# Patient Record
Sex: FEMALE | Race: WHITE | NOT HISPANIC OR LATINO | URBAN - METROPOLITAN AREA
[De-identification: names, ages, dates, MRNs, and addresses within clinical notes are randomized per-mention and may not be internally consistent; named-entity substitution may affect disease eponyms.]

---

## 2021-11-05 ENCOUNTER — APPOINTMENT (RX ONLY)
Dept: URBAN - METROPOLITAN AREA CLINIC 151 | Facility: CLINIC | Age: 56
Setting detail: DERMATOLOGY
End: 2021-11-05

## 2021-11-05 DIAGNOSIS — D22 MELANOCYTIC NEVI: ICD-10-CM

## 2021-11-05 DIAGNOSIS — Z71.89 OTHER SPECIFIED COUNSELING: ICD-10-CM

## 2021-11-05 DIAGNOSIS — L81.4 OTHER MELANIN HYPERPIGMENTATION: ICD-10-CM

## 2021-11-05 DIAGNOSIS — L82.1 OTHER SEBORRHEIC KERATOSIS: ICD-10-CM

## 2021-11-05 DIAGNOSIS — L57.0 ACTINIC KERATOSIS: ICD-10-CM

## 2021-11-05 PROBLEM — D22.5 MELANOCYTIC NEVI OF TRUNK: Status: ACTIVE | Noted: 2021-11-05

## 2021-11-05 PROCEDURE — ? COUNSELING

## 2021-11-05 PROCEDURE — ? FULL BODY SKIN EXAM

## 2021-11-05 PROCEDURE — ? ORDER FOR PHOTODYNAMIC THERAPY

## 2021-11-05 PROCEDURE — 99203 OFFICE O/P NEW LOW 30 MIN: CPT

## 2021-11-05 PROCEDURE — ? PRESCRIPTION MEDICATION MANAGEMENT

## 2021-11-05 PROCEDURE — ? PHOTODYNAMIC THERAPY COUNSELING

## 2021-11-05 ASSESSMENT — LOCATION SIMPLE DESCRIPTION DERM
LOCATION SIMPLE: LEFT UPPER BACK
LOCATION SIMPLE: ABDOMEN
LOCATION SIMPLE: CHEST
LOCATION SIMPLE: RIGHT POSTERIOR THIGH
LOCATION SIMPLE: INFERIOR FOREHEAD
LOCATION SIMPLE: CHIN
LOCATION SIMPLE: RIGHT UPPER BACK
LOCATION SIMPLE: LEFT CHEEK
LOCATION SIMPLE: RIGHT CHEEK

## 2021-11-05 ASSESSMENT — LOCATION DETAILED DESCRIPTION DERM
LOCATION DETAILED: LEFT CHIN
LOCATION DETAILED: RIGHT DISTAL POSTERIOR THIGH
LOCATION DETAILED: LEFT INFERIOR MEDIAL MALAR CHEEK
LOCATION DETAILED: MIDDLE STERNUM
LOCATION DETAILED: INFERIOR MID FOREHEAD
LOCATION DETAILED: LEFT MID-UPPER BACK
LOCATION DETAILED: LEFT CENTRAL MALAR CHEEK
LOCATION DETAILED: RIGHT SUPERIOR UPPER BACK
LOCATION DETAILED: RIGHT MEDIAL MALAR CHEEK
LOCATION DETAILED: EPIGASTRIC SKIN

## 2021-11-05 ASSESSMENT — LOCATION ZONE DERM
LOCATION ZONE: TRUNK
LOCATION ZONE: LEG
LOCATION ZONE: FACE

## 2021-11-05 NOTE — PROCEDURE: ORDER FOR PHOTODYNAMIC THERAPY
Chest Incubation Time: 2 Hours
Face And Scalp Incubation Time: 1 Hour for the face and 2 Hours for the scalp
Occlusion: No
Detail Level: Simple
Face, Ears And  Scalp Incubation Time: 1 Hour
Photosensitizer: Levulan
Face Incubation Time: 1 Hour omar to step in
Pdt Type: ELVIRA-U
Consent: The procedure and risks were reviewed with the patient including but not limited to: burning, pigmentary changes, pain, blistering, scabbing, redness, and the possibility of needing numerous treatments. Strict photoprotection after the procedure was also discussed.
Location: Face
Frequency Of Pdt: Single Treatment
Location: Chest
Chest Incubation Time: 2 Hours omar to step in

## 2021-11-05 NOTE — PROCEDURE: PRESCRIPTION MEDICATION MANAGEMENT
Render In Strict Bullet Format?: No
Detail Level: Zone
Plan: Spoke to patient about different treatment options including liquid nitrogen vs PDT blue light vs efudex topical cream. 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 Patient states that she would have time to have any treatment for the next few weeks. Patient states that she would like to have PDT light to the chest and the face.

## 2024-02-01 ENCOUNTER — APPOINTMENT (RX ONLY)
Dept: URBAN - METROPOLITAN AREA CLINIC 132 | Facility: CLINIC | Age: 59
Setting detail: DERMATOLOGY
End: 2024-02-01

## 2024-02-01 DIAGNOSIS — L71.8 OTHER ROSACEA: ICD-10-CM

## 2024-02-01 DIAGNOSIS — L57.0 ACTINIC KERATOSIS: ICD-10-CM

## 2024-02-01 DIAGNOSIS — L81.4 OTHER MELANIN HYPERPIGMENTATION: ICD-10-CM | Status: STABLE

## 2024-02-01 DIAGNOSIS — L82.1 OTHER SEBORRHEIC KERATOSIS: ICD-10-CM | Status: STABLE

## 2024-02-01 DIAGNOSIS — D18.0 HEMANGIOMA: ICD-10-CM | Status: STABLE

## 2024-02-01 DIAGNOSIS — D22 MELANOCYTIC NEVI: ICD-10-CM | Status: STABLE

## 2024-02-01 DIAGNOSIS — L57.8 OTHER SKIN CHANGES DUE TO CHRONIC EXPOSURE TO NONIONIZING RADIATION: ICD-10-CM

## 2024-02-01 PROBLEM — D18.01 HEMANGIOMA OF SKIN AND SUBCUTANEOUS TISSUE: Status: ACTIVE | Noted: 2024-02-01

## 2024-02-01 PROBLEM — D22.9 MELANOCYTIC NEVI, UNSPECIFIED: Status: ACTIVE | Noted: 2024-02-01

## 2024-02-01 PROCEDURE — ? LIQUID NITROGEN

## 2024-02-01 PROCEDURE — ? PRESCRIPTION MEDICATION MANAGEMENT

## 2024-02-01 PROCEDURE — ? SUNSCREEN RECOMMENDATIONS

## 2024-02-01 PROCEDURE — ? COUNSELING

## 2024-02-01 PROCEDURE — 99204 OFFICE O/P NEW MOD 45 MIN: CPT | Mod: 25

## 2024-02-01 PROCEDURE — 17003 DESTRUCT PREMALG LES 2-14: CPT

## 2024-02-01 PROCEDURE — 17000 DESTRUCT PREMALG LESION: CPT

## 2024-02-01 PROCEDURE — ? PRESCRIPTION

## 2024-02-01 RX ORDER — IVERMECTIN 10 MG/G
THIN LAYER CREAM TOPICAL QD
Qty: 45 | Refills: 3 | Status: ERX | COMMUNITY
Start: 2024-02-01

## 2024-02-01 RX ADMIN — IVERMECTIN THIN LAYER: 10 CREAM TOPICAL at 00:00

## 2024-02-01 ASSESSMENT — PAIN INTENSITY VAS: HOW INTENSE IS YOUR PAIN 0 BEING NO PAIN, 10 BEING THE MOST SEVERE PAIN POSSIBLE?: NO PAIN

## 2024-02-01 ASSESSMENT — LOCATION SIMPLE DESCRIPTION DERM
LOCATION SIMPLE: RIGHT CHEEK
LOCATION SIMPLE: NOSE
LOCATION SIMPLE: GLABELLA
LOCATION SIMPLE: RIGHT ZYGOMA
LOCATION SIMPLE: RIGHT FOREHEAD

## 2024-02-01 ASSESSMENT — LOCATION DETAILED DESCRIPTION DERM
LOCATION DETAILED: GLABELLA
LOCATION DETAILED: RIGHT SUPERIOR FOREHEAD
LOCATION DETAILED: RIGHT INFERIOR MEDIAL FOREHEAD
LOCATION DETAILED: RIGHT MEDIAL MALAR CHEEK
LOCATION DETAILED: RIGHT CENTRAL ZYGOMA
LOCATION DETAILED: NASAL DORSUM

## 2024-02-01 ASSESSMENT — LOCATION ZONE DERM
LOCATION ZONE: NOSE
LOCATION ZONE: FACE

## 2024-02-01 NOTE — PROCEDURE: PRESCRIPTION MEDICATION MANAGEMENT
Initiate Treatment: Soolantra to apply to face daily
Render In Strict Bullet Format?: No
Detail Level: Zone

## 2024-02-01 NOTE — PROCEDURE: LIQUID NITROGEN
Render Note In Bullet Format When Appropriate: No
Detail Level: Detailed
Show Aperture Variable?: Yes
Consent: The patient's verbal consent was obtained including but not limited to risks of crusting, scabbing, blistering, scarring, darker or lighter pigmentary change, recurrence, incomplete removal and infection.
Number Of Freeze-Thaw Cycles: 2 freeze-thaw cycles
Duration Of Freeze Thaw-Cycle (Seconds): 0
Post-Care Instructions: I reviewed with the patient in detail post-care instructions. Patient is to wear sunprotection, and avoid picking at any of the treated lesions. Pt may apply Vaseline to crusted or scabbing areas and cover as needed

## 2024-08-01 ENCOUNTER — APPOINTMENT (RX ONLY)
Dept: URBAN - METROPOLITAN AREA CLINIC 132 | Facility: CLINIC | Age: 59
Setting detail: DERMATOLOGY
End: 2024-08-01

## 2024-08-01 DIAGNOSIS — D49.2 NEOPLASM OF UNSPECIFIED BEHAVIOR OF BONE, SOFT TISSUE, AND SKIN: ICD-10-CM

## 2024-08-01 DIAGNOSIS — L57.8 OTHER SKIN CHANGES DUE TO CHRONIC EXPOSURE TO NONIONIZING RADIATION: ICD-10-CM

## 2024-08-01 DIAGNOSIS — L71.8 OTHER ROSACEA: ICD-10-CM

## 2024-08-01 DIAGNOSIS — L81.4 OTHER MELANIN HYPERPIGMENTATION: ICD-10-CM | Status: STABLE

## 2024-08-01 DIAGNOSIS — D18.0 HEMANGIOMA: ICD-10-CM | Status: STABLE

## 2024-08-01 DIAGNOSIS — L82.1 OTHER SEBORRHEIC KERATOSIS: ICD-10-CM | Status: STABLE

## 2024-08-01 DIAGNOSIS — D22 MELANOCYTIC NEVI: ICD-10-CM | Status: STABLE

## 2024-08-01 PROBLEM — D22.9 MELANOCYTIC NEVI, UNSPECIFIED: Status: ACTIVE | Noted: 2024-08-01

## 2024-08-01 PROBLEM — D18.01 HEMANGIOMA OF SKIN AND SUBCUTANEOUS TISSUE: Status: ACTIVE | Noted: 2024-08-01

## 2024-08-01 PROCEDURE — ? PRESCRIPTION

## 2024-08-01 PROCEDURE — ? COUNSELING

## 2024-08-01 PROCEDURE — ? PRESCRIPTION MEDICATION MANAGEMENT

## 2024-08-01 PROCEDURE — ? SUNSCREEN RECOMMENDATIONS

## 2024-08-01 PROCEDURE — 99214 OFFICE O/P EST MOD 30 MIN: CPT | Mod: 25

## 2024-08-01 PROCEDURE — 11102 TANGNTL BX SKIN SINGLE LES: CPT

## 2024-08-01 PROCEDURE — ? BIOPSY BY SHAVE METHOD

## 2024-08-01 RX ORDER — FLUOROURACIL 5 MG/G
THIN LAYER CREAM TOPICAL BID
Qty: 40 | Refills: 2 | Status: ERX | COMMUNITY
Start: 2024-08-01

## 2024-08-01 RX ORDER — IVERMECTIN 10 MG/G
THIN LAYER CREAM TOPICAL QD
Qty: 45 | Refills: 3 | Status: ERX

## 2024-08-01 RX ADMIN — FLUOROURACIL THIN LAYER: 5 CREAM TOPICAL at 00:00

## 2024-08-01 ASSESSMENT — LOCATION DETAILED DESCRIPTION DERM
LOCATION DETAILED: SUPERIOR MID FOREHEAD
LOCATION DETAILED: RIGHT INFERIOR MEDIAL FOREHEAD
LOCATION DETAILED: LEFT INFERIOR UPPER BACK

## 2024-08-01 ASSESSMENT — LOCATION SIMPLE DESCRIPTION DERM
LOCATION SIMPLE: LEFT UPPER BACK
LOCATION SIMPLE: SUPERIOR FOREHEAD
LOCATION SIMPLE: RIGHT FOREHEAD

## 2024-08-01 ASSESSMENT — LOCATION ZONE DERM
LOCATION ZONE: TRUNK
LOCATION ZONE: FACE

## 2024-08-01 NOTE — PROCEDURE: BIOPSY BY SHAVE METHOD
Detail Level: Detailed
Depth Of Biopsy: dermis
Was A Bandage Applied: Yes
Size Of Lesion In Cm: 3.5
X Size Of Lesion In Cm: 0
Biopsy Type: H and E
Biopsy Method: 15 blade
Anesthesia Type: 1% lidocaine without epinephrine
Anesthesia Volume In Cc: 1
Hemostasis: Electrocautery
Wound Care: Vaseline
Dressing: bandage
Destruction After The Procedure: No
Type Of Destruction Used: Curettage
Cryotherapy Text: The wound bed was treated with cryotherapy after the biopsy was performed.
Electrodesiccation Text: The wound bed was treated with electrodesiccation after the biopsy was performed.
Electrodesiccation And Curettage Text: The wound bed was treated with electrodesiccation and curettage after the biopsy was performed.
Silver Nitrate Text: The wound bed was treated with silver nitrate after the biopsy was performed.
Lab: [id number]
Lab Facility: [id number]
Consent: Written consent was obtained and risks were reviewed including but not limited to scarring, infection, bleeding, scabbing, incomplete removal, nerve damage and allergy to anesthesia.
Post-Care Instructions: I reviewed with the patient in detail post-care instructions. Patient is to keep the biopsy site dry overnight, and then apply vaseline and cover daily until healed.
Notification Instructions: Patient will be notified of biopsy results within one week. However, patient instructed to call the office if not contacted within two weeks.
Billing Type: Third-Party Bill
Information: Selecting Yes will display possible errors in your note based on the variables you have selected. This validation is only offered as a suggestion for you. PLEASE NOTE THAT THE VALIDATION TEXT WILL BE REMOVED WHEN YOU FINALIZE YOUR NOTE. IF YOU WANT TO FAX A PRELIMINARY NOTE YOU WILL NEED TO TOGGLE THIS TO 'NO' IF YOU DO NOT WANT IT IN YOUR FAXED NOTE.

## 2024-08-01 NOTE — PROCEDURE: PRESCRIPTION MEDICATION MANAGEMENT
Continue Regimen: Soolantra to face QD
Render In Strict Bullet Format?: No
Detail Level: Zone
Plan: Follow up in 3 months
Initiate Treatment: Efudex to face BID for 2-3 weeks.

## 2024-11-14 ENCOUNTER — APPOINTMENT (RX ONLY)
Dept: URBAN - METROPOLITAN AREA CLINIC 132 | Facility: CLINIC | Age: 59
Setting detail: DERMATOLOGY
End: 2024-11-14

## 2024-11-14 DIAGNOSIS — Z09 ENCOUNTER FOR FOLLOW-UP EXAMINATION AFTER COMPLETED TREATMENT FOR CONDITIONS OTHER THAN MALIGNANT NEOPLASM: ICD-10-CM

## 2024-11-14 DIAGNOSIS — L57.8 OTHER SKIN CHANGES DUE TO CHRONIC EXPOSURE TO NONIONIZING RADIATION: ICD-10-CM

## 2024-11-14 DIAGNOSIS — L81.0 POSTINFLAMMATORY HYPERPIGMENTATION: ICD-10-CM

## 2024-11-14 DIAGNOSIS — L82.0 INFLAMED SEBORRHEIC KERATOSIS: ICD-10-CM

## 2024-11-14 DIAGNOSIS — I83.9 ASYMPTOMATIC VARICOSE VEINS OF LOWER EXTREMITIES: ICD-10-CM

## 2024-11-14 DIAGNOSIS — L57.0 ACTINIC KERATOSIS: ICD-10-CM

## 2024-11-14 PROBLEM — I83.92 ASYMPTOMATIC VARICOSE VEINS OF LEFT LOWER EXTREMITY: Status: ACTIVE | Noted: 2024-11-14

## 2024-11-14 PROCEDURE — 99213 OFFICE O/P EST LOW 20 MIN: CPT | Mod: 25

## 2024-11-14 PROCEDURE — ? LIQUID NITROGEN

## 2024-11-14 PROCEDURE — 17000 DESTRUCT PREMALG LESION: CPT | Mod: 59

## 2024-11-14 PROCEDURE — ? COUNSELING

## 2024-11-14 PROCEDURE — ? PRESCRIPTION MEDICATION MANAGEMENT

## 2024-11-14 PROCEDURE — ? PRESCRIPTION

## 2024-11-14 PROCEDURE — 17110 DESTRUCTION B9 LES UP TO 14: CPT

## 2024-11-14 RX ORDER — TRIAMCINOLONE ACETONIDE 0.25 MG/G
THIN LAYER CREAM TOPICAL BID
Qty: 15 | Refills: 0 | Status: ERX | COMMUNITY
Start: 2024-11-14

## 2024-11-14 RX ADMIN — TRIAMCINOLONE ACETONIDE THIN LAYER: 0.25 CREAM TOPICAL at 00:00

## 2024-11-14 ASSESSMENT — LOCATION SIMPLE DESCRIPTION DERM
LOCATION SIMPLE: LEFT CHEEK
LOCATION SIMPLE: LEFT CALF
LOCATION SIMPLE: RIGHT CHEEK

## 2024-11-14 ASSESSMENT — LOCATION DETAILED DESCRIPTION DERM
LOCATION DETAILED: LEFT SUPERIOR CENTRAL BUCCAL CHEEK
LOCATION DETAILED: RIGHT MEDIAL MALAR CHEEK
LOCATION DETAILED: LEFT PROXIMAL CALF
LOCATION DETAILED: LEFT CENTRAL MALAR CHEEK
LOCATION DETAILED: LEFT INFERIOR LATERAL MALAR CHEEK
LOCATION DETAILED: LEFT MEDIAL MALAR CHEEK

## 2024-11-14 ASSESSMENT — LOCATION ZONE DERM
LOCATION ZONE: LEG
LOCATION ZONE: FACE

## 2024-11-14 NOTE — PROCEDURE: LIQUID NITROGEN
Duration Of Freeze Thaw-Cycle (Seconds): 0
Post-Care Instructions: I reviewed with the patient in detail post-care instructions. Patient is to wear sunprotection, and avoid picking at any of the treated lesions. Pt may apply Vaseline to crusted or scabbing areas and cover as needed
Render Note In Bullet Format When Appropriate: No
Show Applicator Variable?: Yes
Detail Level: Detailed
Consent: The patient's verbal consent was obtained including but not limited to risks of crusting, scabbing, blistering, scarring, darker or lighter pigmentary change, recurrence, incomplete removal and infection.
Spray Paint Text: The liquid nitrogen was applied to the skin utilizing a spray paint frosting technique.
Medical Necessity Information: It is in your best interest to select a reason for this procedure from the list below. All of these items fulfill various CMS LCD requirements except the new and changing color options.
Post-Care Instructions: I reviewed with the patient in detail post-care instructions. Patient is to wear sunprotection, and avoid picking at any of the treated lesions. Pt may apply Vaseline to crusted or scabbing areas.

## 2024-12-04 ENCOUNTER — APPOINTMENT (OUTPATIENT)
Dept: URBAN - METROPOLITAN AREA CLINIC 132 | Facility: CLINIC | Age: 59
Setting detail: DERMATOLOGY
End: 2024-12-04

## 2024-12-04 PROBLEM — C44.519 BASAL CELL CARCINOMA OF SKIN OF OTHER PART OF TRUNK: Status: ACTIVE | Noted: 2024-12-04

## 2024-12-04 PROCEDURE — 17264 DSTRJ MAL LES T/A/L 3.1-4.0: CPT

## 2024-12-04 PROCEDURE — ? CURETTAGE AND DESTRUCTION

## 2024-12-04 PROCEDURE — ? PRESCRIPTION

## 2024-12-04 RX ORDER — MUPIROCIN 20 MG/G
THIN LAYER OINTMENT TOPICAL QD
Qty: 22 | Refills: 2 | Status: ERX | COMMUNITY
Start: 2024-12-04

## 2024-12-04 RX ADMIN — MUPIROCIN THIN LAYER: 20 OINTMENT TOPICAL at 00:00

## 2024-12-04 NOTE — PROCEDURE: CURETTAGE AND DESTRUCTION
Detail Level: Detailed
Biopsy Photograph Reviewed: Yes
Accession # (Optional): [id number]
Number Of Curettages: 3
Size Of Lesion In Cm: 3.6
Size Of Lesion After Curettage: 3.7
Add Intralesional Injection: No
Total Volume (Ccs): 1
Anesthesia Type: 1% lidocaine with epinephrine
Cautery Type: electrodesiccation
What Was Performed First?: Curettage
Final Size Statement: The size of the lesion after curettage was
Additional Information: (Optional): The wound was cleaned, and a pressure dressing was applied.  The patient received detailed post-op instructions.
Consent was obtained from the patient. The risks, benefits and alternatives to therapy were discussed in detail. Specifically, the risks of infection, scarring, bleeding, prolonged wound healing, nerve injury, incomplete removal, allergy to anesthesia and recurrence were addressed. Alternatives to ED&C, such as: surgical removal and XRT were also discussed.  Prior to the procedure, the treatment site was clearly identified and confirmed by the patient. All components of Universal Protocol/PAUSE Rule completed.
Post-Care Instructions: I reviewed with the patient in detail post-care instructions. Patient is to keep the area dry for 48 hours, and not to engage in any swimming until the area is healed. Should the patient develop any fevers, chills, bleeding, severe pain patient will contact the office immediately.
Bill As A Line Item Or As Units: Line Item

## 2025-01-28 ENCOUNTER — PREPPED CHART (OUTPATIENT)
Age: 60
End: 2025-01-28

## 2025-02-20 ENCOUNTER — APPOINTMENT (OUTPATIENT)
Dept: URBAN - METROPOLITAN AREA CLINIC 135 | Facility: CLINIC | Age: 60
Setting detail: DERMATOLOGY
End: 2025-02-20

## 2025-02-20 DIAGNOSIS — I87.2 VENOUS INSUFFICIENCY (CHRONIC) (PERIPHERAL): ICD-10-CM

## 2025-02-20 DIAGNOSIS — I83.81 VARICOSE VEINS OF LOWER EXTREMITIES WITH PAIN: ICD-10-CM | Status: WORSENING

## 2025-02-20 PROBLEM — I83.813 VARICOSE VEINS OF BILATERAL LOWER EXTREMITIES WITH PAIN: Status: ACTIVE | Noted: 2025-02-20

## 2025-02-20 PROCEDURE — ? PHOTO-DOCUMENTATION

## 2025-02-20 PROCEDURE — ? MEDICAL CONSULTATION: VENOUS DISEASE

## 2025-02-20 PROCEDURE — ? ADDITIONAL NOTES

## 2025-02-20 PROCEDURE — ? CEAP CLASSIFICATION

## 2025-02-20 PROCEDURE — 99214 OFFICE O/P EST MOD 30 MIN: CPT

## 2025-02-20 PROCEDURE — ? VENOUS CLINICAL SEVERITY SCORE

## 2025-02-20 PROCEDURE — ? RECOMMENDATIONS

## 2025-02-20 ASSESSMENT — LOCATION SIMPLE DESCRIPTION DERM
LOCATION SIMPLE: RIGHT PRETIBIAL REGION
LOCATION SIMPLE: LEFT PRETIBIAL REGION

## 2025-02-20 ASSESSMENT — LOCATION ZONE DERM: LOCATION ZONE: LEG

## 2025-02-20 ASSESSMENT — LOCATION DETAILED DESCRIPTION DERM
LOCATION DETAILED: LEFT PROXIMAL PRETIBIAL REGION
LOCATION DETAILED: RIGHT DISTAL PRETIBIAL REGION

## 2025-02-20 NOTE — PROCEDURE: ADDITIONAL NOTES
Detail Level: Simple
Render Risk Assessment In Note?: no
Additional Notes: Patient was previously counseled to wear compression stockings for symptom relief. Please refer to visit notes from 11/14/24.

## 2025-02-20 NOTE — HPI: VEIN EVALUATION
Do You Have A Family History Of Vein Disease?: no
Which Leg Is Worse?: Equally Affected
How Severe Is/Are Your Symptoms?: mild
Is This A New Presentation, Or A Follow-Up?: Vein Evaluation
Additional History: Patient was previously counseled to wear compression stockings for symptom relief. Please refer to visit notes from 11/14/24.
Referred By:: Cassia BETHEA

## 2025-02-20 NOTE — PROCEDURE: MEDICAL CONSULTATION: VENOUS DISEASE
Left Dorsalis Pedis Pulse: 2 (Easily palpable)
Right Leg Ulcer Diameter: 0- None
Include Right Vcss In Note: Yes
Right Leg: Peripheral Vascular Disease?: No
Left Leg Compression Therapy: 0- None or noncompliant
Detail Level: Zone
Left Leg Circumference: medium
Right Leg Venous Hyperpigmentation: 0- None or focal low intensity (tan)

## 2025-02-20 NOTE — PROCEDURE: VENOUS CLINICAL SEVERITY SCORE
Left Leg Induration: 0- None
Right Leg Venous Edema: 1- Evening ankle edema only
Detail Level: Simple
Left Leg Pigmentation: 0- None or focal low intensity (tan)
Right Leg Pain: 1- Occasional, not restricting activity or requiring analgesics
Left Leg Varicose Veins: 2- Multiple: GS varicose veins confined to calf or thigh
Right Leg Compression Therapy: 1- Intermittent use of stockings
Include Right Vcss In Note: Yes

## 2025-02-20 NOTE — PROCEDURE: RECOMMENDATIONS
Recommendations (Free Text): The patient will be scheduled for a bilateral lower extremity venous ultrasound in the coming weeks to determine the extent of venous insufficiency. Afterwards, the patient will schedule a follow up visit with the provider to review their results. Going forward, they are to begin/continue with conservative management and follow up in the clinic to reevaluate symptoms and determine future management.
Render Risk Assessment In Note?: yes
Patient Management Risk Assessment: Moderate
Recommendation Preamble: The following recommendations were made during the visit:
Detail Level: Zone

## 2025-03-07 ENCOUNTER — APPOINTMENT (OUTPATIENT)
Dept: URBAN - METROPOLITAN AREA CLINIC 135 | Facility: CLINIC | Age: 60
Setting detail: DERMATOLOGY
End: 2025-03-07

## 2025-03-07 DIAGNOSIS — I87.2 VENOUS INSUFFICIENCY (CHRONIC) (PERIPHERAL): ICD-10-CM

## 2025-03-07 PROCEDURE — 93970 EXTREMITY STUDY: CPT

## 2025-03-07 PROCEDURE — ? DOPPLER US

## 2025-03-07 NOTE — PROCEDURE: DOPPLER US
Use Ssv Or Lsv: LSV
Left Spj Reflux (Sec): no reflux
Detail Level: Detailed
Use - 'see Attached Documentation' Verbiage?: Yes - Will Include Text Below and Will Remove Other Portions of the Note
See Attached Documentation Text: Please refer to the attached ultrasound documentation for complete details of the procedure and the venous findings.

## 2025-03-11 ENCOUNTER — CONSULTATION/EVALUATION (OUTPATIENT)
Age: 60
End: 2025-03-11

## 2025-03-11 DIAGNOSIS — H16.223: ICD-10-CM

## 2025-03-11 DIAGNOSIS — H02.883: ICD-10-CM

## 2025-03-11 DIAGNOSIS — H01.006: ICD-10-CM

## 2025-03-11 DIAGNOSIS — H01.003: ICD-10-CM

## 2025-03-11 DIAGNOSIS — H25.813: ICD-10-CM

## 2025-03-11 DIAGNOSIS — H02.886: ICD-10-CM

## 2025-03-11 PROCEDURE — 92025 CPTRIZED CORNEAL TOPOGRAPHY: CPT

## 2025-03-11 PROCEDURE — 92134 CPTRZ OPH DX IMG PST SGM RTA: CPT | Mod: NC

## 2025-03-11 PROCEDURE — 92136 OPHTHALMIC BIOMETRY: CPT

## 2025-03-11 PROCEDURE — 68761L LACRIFILL: Mod: E1,E2,E3,E4

## 2025-03-11 PROCEDURE — 99204 OFFICE O/P NEW MOD 45 MIN: CPT | Mod: 25

## 2025-03-11 RX ORDER — KETOROLAC TROMETHAMINE 5 MG/ML: 1 SOLUTION OPHTHALMIC

## 2025-03-11 RX ORDER — MOXIFLOXACIN OPHTHALMIC 5 MG/ML: 1 SOLUTION/ DROPS OPHTHALMIC

## 2025-03-11 RX ORDER — PREDNISOLONE ACETATE 10 MG/ML: 1 SUSPENSION/ DROPS OPHTHALMIC

## 2025-03-27 ENCOUNTER — APPOINTMENT (OUTPATIENT)
Dept: URBAN - METROPOLITAN AREA CLINIC 135 | Facility: CLINIC | Age: 60
Setting detail: DERMATOLOGY
End: 2025-03-27

## 2025-03-27 DIAGNOSIS — I87.2 VENOUS INSUFFICIENCY (CHRONIC) (PERIPHERAL): ICD-10-CM | Status: INADEQUATELY CONTROLLED

## 2025-03-27 PROCEDURE — ? PATIENT SPECIFIC COUNSELING

## 2025-03-27 PROCEDURE — ? RECOMMENDATIONS

## 2025-03-27 PROCEDURE — 99214 OFFICE O/P EST MOD 30 MIN: CPT

## 2025-03-27 PROCEDURE — ? VEIN TREATMENT PLAN

## 2025-03-27 NOTE — PROCEDURE: VEIN TREATMENT PLAN
Symptom Statement (Will Not Render If Left Blank And Will Be Added Automatically If Ordering Surgical Interventions): The patient has signs and symptoms of chronic venous hypertension affecting daily function with US of the lower extremities demonstrating venous insufficiency. The patient has failed conservative treatment including avoiding prolonged standing, leg elevation, analgesis, exercise, weight management and graduated compression stockings (20-30mmHg or higher).
Follow-Up After Conservative Therapy: 6 Weeks
Taylor Sessions - Left: 1
Continue Conservative Therapy After U/S: No
Closing Statement (Will Not Render If Left Blank): We discussed all treatment options. Risks and benefits of each procedure were discussed. These include but are not limited to: deep vein thrombosis, pulmonary embolism, paresthesia, phlebitis, infection, bleeding, and visible scarring. After the risks and benefits were reviewed with the patient and all of their questions were answered they decided to move forward with treatment. A patient education booklet was given and pre/post procedure instructions were reviewed with the patient.
Detail Level: Simple
Continue Conservative Therapy Text: The patient has signs and symptoms of chronic venous hypertension affecting daily function with ultrasound of the lower extremities demonstrating venous insufficiency. The patient will continue conservative treatment and has been counseled on avoiding prolonged standing, leg elevation, analgesics, exercise weight management, and daily graduated compression stockings use (20-30mmHg or higher).

## 2025-03-27 NOTE — PROCEDURE: RECOMMENDATIONS
Recommendation Preamble: The following recommendations were made during the visit:
Render Risk Assessment In Note?: yes
Recommendations (Free Text): Patient may schedule for procedure, next available at preferred location\\nPre/Post care instructions have been reviewed verbally and given in written form.
Patient Management Risk Assessment: Moderate
Detail Level: Zone

## 2025-05-01 ENCOUNTER — APPOINTMENT (OUTPATIENT)
Dept: URBAN - METROPOLITAN AREA CLINIC 135 | Facility: CLINIC | Age: 60
Setting detail: DERMATOLOGY
End: 2025-05-01

## 2025-05-01 DIAGNOSIS — I87.2 VENOUS INSUFFICIENCY (CHRONIC) (PERIPHERAL): ICD-10-CM | Status: UNCHANGED

## 2025-05-01 PROCEDURE — 36475 ENDOVENOUS RF 1ST VEIN: CPT | Mod: RT

## 2025-05-01 PROCEDURE — ? RECOMMENDATIONS

## 2025-05-01 PROCEDURE — ? ENDOVENOUS ABLATION

## 2025-05-01 ASSESSMENT — LOCATION ZONE DERM: LOCATION ZONE: LEG

## 2025-05-01 ASSESSMENT — LOCATION SIMPLE DESCRIPTION DERM: LOCATION SIMPLE: RIGHT PRETIBIAL REGION

## 2025-05-01 ASSESSMENT — LOCATION DETAILED DESCRIPTION DERM: LOCATION DETAILED: RIGHT MEDIAL PROXIMAL PRETIBIAL REGION

## 2025-05-01 NOTE — PROCEDURE: ENDOVENOUS ABLATION
Rf Access: Distal Thigh
Estimated Blood Loss (Cc): minimal
Rf Sheaths Used: JUAN Introducer Set
Tumescent Anesthesia Administered By (Optional): Dr. Drake Francis
Number Of Incisions Per Microphlebectomy: 0
Rf Temperature (Include Units): 120 celsius
Rf Cycles: 5
Medical Necessity Information: **LCD Guidelines vary from payer to payer. \\n**Please check with your payer's policy to determine medical necessity.
Number Of Catheters Used: 1
Disposition: Compression dressings and stocking(s) were placed post-operatively. Wound care instructions were given, verbal and written.
Hemostasis: Pressure
Consent was obtained for the above procedure(s).\\n-----------\\nThis therapy is medically necessary as the patient continues to be symptomatic despite trying and failing conservative therapies that include compression stockings, leg elevation, exercise, and avoidance of prolonged standing. Patient has been diagnosed with chronic venous insufficiency that has been confirmed by duplex ultrasound and they meet the criteria set forth by their insurance company to qualify for treatment.\\n\\nPROCEDURE:
Detail Level: Detailed
Rf Catheter Used?: RF Smart CF7-7-60
Tumescent Anesthesia Volume In Cc: 250
Rf Time (Include Units): 100 s
Add Additional Vein Treatment Details: No
Show Inventory: Hide
Vein Treated: Right GSV
Total Vein Length (Include Units): 26.5
Labs/EKG

## 2025-05-01 NOTE — PROCEDURE: RECOMMENDATIONS
Recommendation Preamble: The following recommendations were made during the visit:
Detail Level: Zone
Patient Management Risk Assessment: Moderate
Render Risk Assessment In Note?: yes
Recommendations (Free Text): Patient will schedule for limited venous ultrasound in one week post ablation of the treated GSV.\\nContact information for Dr. Drake Francis's Vein Team was given to the patient for any questions or concerns following the procedure. \\n\\nPOST CARE INSTRUCTIONS:\\n*Activity: You MUST walk at least 10 minutes every hour you are awake for the first 48 hours. You do not have to wake up at night to walk. You MUST keep compression stockings on for 48 hours straight. Steri-strips can be removed 5 days post ablation. Avoid strenuous exercises and weight lifting over 30lbs for 48 hours. Avoid flying or any long car trips for 2 weeks post procedure.\\n\\n*Compression and Wound Care: You may remove the compression stocking the morning of the second day after surgery. That is when you can shower as normal. If you are not having pain, you do not have to continue wearing the compression stockings. If you are having discomfort, the stockings may help and patient should continue to wear during the day for another 1-2 weeks, You may perform side lunges for thigh and calf stretching post ablation and Massaging the treated area, applying warm compress can also help with any discomfort.\\n\\n*Return Appointment: After the procedure, a return visit within 7 days is essential. An ultrasound examination will be done to confirm the successful treatment of your vein and to evaluate for any complications.\\n\\n*Bathing and Showering: After 48 hours, you may shower as normal, but still must avoid submersing in hot tubs, jacuzzi's or steam rooms for 1 week. Clean your surgery sites during showering and when you are finished bathing, pat your leg dry with a towel and repeat wound care instructions. For two weeks after treatment avoid submersing in hot tubs or hot baths.\\n\\n*Discomfort: Any pain or discomfort should decrease with each day after surgery. You may take over the counter medicines such as Ibuprofen (400mg every 8 hours) or Tylenol (500mg every 6 hours) with food to alleviate any soreness, tightness and numbness which is normal after procedure and should dissipate over the next 1-2 weeks. \\n\\nIf your pain is severe or not improving, please contact Dr. Francis's Clinical Team.

## 2025-05-08 ENCOUNTER — APPOINTMENT (OUTPATIENT)
Dept: URBAN - METROPOLITAN AREA CLINIC 135 | Facility: CLINIC | Age: 60
Setting detail: DERMATOLOGY
End: 2025-05-08

## 2025-05-08 DIAGNOSIS — I87.2 VENOUS INSUFFICIENCY (CHRONIC) (PERIPHERAL): ICD-10-CM

## 2025-05-08 PROCEDURE — ? RECOMMENDATIONS

## 2025-05-08 PROCEDURE — ? DOPPLER US

## 2025-05-08 PROCEDURE — 93971 EXTREMITY STUDY: CPT | Mod: 59

## 2025-05-08 PROCEDURE — ? ENDOVENOUS ABLATION

## 2025-05-08 PROCEDURE — 36475 ENDOVENOUS RF 1ST VEIN: CPT | Mod: LT

## 2025-05-08 ASSESSMENT — LOCATION DETAILED DESCRIPTION DERM: LOCATION DETAILED: LEFT ANTERIOR DISTAL THIGH

## 2025-05-08 ASSESSMENT — LOCATION SIMPLE DESCRIPTION DERM: LOCATION SIMPLE: LEFT THIGH

## 2025-05-08 ASSESSMENT — LOCATION ZONE DERM: LOCATION ZONE: LEG

## 2025-05-08 NOTE — PROCEDURE: RECOMMENDATIONS
Recommendations (Free Text): Patient will schedule for limited venous ultrasound in one week post ablation of the treated GSV.\\nContact information for Dr. Drake Francis's Vein Team was given to the patient for any questions or concerns following the procedure. \\n\\nPOST CARE INSTRUCTIONS:\\n*Activity: You MUST walk at least 10 minutes every hour you are awake for the first 48 hours. You do not have to wake up at night to walk. You MUST keep compression stockings on for 48 hours straight. Steri-strips can be removed 5 days post ablation. Avoid strenuous exercises and weight lifting over 30lbs for 48 hours. Avoid flying or any long car trips for 2 weeks post procedure.\\n\\n*Compression and Wound Care: You may remove the compression stocking the morning of the second day after surgery. That is when you can shower as normal. If you are not having pain, you do not have to continue wearing the compression stockings. If you are having discomfort, the stockings may help and patient should continue to wear during the day for another 1-2 weeks, You may perform side lunges for thigh and calf stretching post ablation and Massaging the treated area, applying warm compress can also help with any discomfort.\\n\\n*Return Appointment: After the procedure, a return visit within 7 days is essential. An ultrasound examination will be done to confirm the successful treatment of your vein and to evaluate for any complications.\\n\\n*Bathing and Showering: After 48 hours, you may shower as normal, but still must avoid submersing in hot tubs, jacuzzi's or steam rooms for 1 week. Clean your surgery sites during showering and when you are finished bathing, pat your leg dry with a towel and repeat wound care instructions. For two weeks after treatment avoid submersing in hot tubs or hot baths.\\n\\n*Discomfort: Any pain or discomfort should decrease with each day after surgery. You may take over the counter medicines such as Ibuprofen (400mg every 8 hours) or Tylenol (500mg every 6 hours) with food to alleviate any soreness, tightness and numbness which is normal after procedure and should dissipate over the next 1-2 weeks. \\n\\nIf your pain is severe or not improving, please contact Dr. Francis's Clinical Team.
Detail Level: Zone
Patient Management Risk Assessment: Moderate
Recommendation Preamble: The following recommendations were made during the visit:
Render Risk Assessment In Note?: yes

## 2025-05-08 NOTE — PROCEDURE: DOPPLER US
Detail Level: Detailed
Other Right Leg Doppler Findings: No evidence of Deep Vein Thrombosis. Successful closure of previously treated veins.
Use - 'see Attached Documentation' Verbiage?: Yes - Will Include Text Below and Will Remove Other Portions of the Note
Use Ssv Or Lsv: SSV
See Attached Documentation Text: No evidence of Deep Vein Thrombosis.\\nSuccessful Closure of previously treated vein.\\nPlease refer to the attached ultrasound documentation for complete details of the procedure and the venous findings.

## 2025-05-08 NOTE — PROCEDURE: ENDOVENOUS ABLATION
Estimated Blood Loss (Cc): minimal
Rf Access: Left Distal Thigh
Medical Necessity Information: **LCD Guidelines vary from payer to payer. \\n**Please check with your payer's policy to determine medical necessity.
Rf Sheaths Used: JUAN Introducer Set
Number Of Catheters Used: 1
Tumescent Anesthesia Administered By (Optional): Dr. Drake Francis
Rf Temperature (Include Units): 120 celsius
Detail Level: Detailed
Rf Time (Include Units): Length: 26.5 cm
Hemostasis: Pressure
Rf Cycles: 5
Rf Catheter Used?: RF Smart CF7-7-60
Number Of Incisions Per Microphlebectomy: 0
Consent was obtained for the above procedure(s).\\n-----------\\nThis therapy is medically necessary as the patient continues to be symptomatic despite trying and failing conservative therapies that include compression stockings, leg elevation, exercise, and avoidance of prolonged standing. Patient has been diagnosed with chronic venous insufficiency that has been confirmed by duplex ultrasound and they meet the criteria set forth by their insurance company to qualify for treatment.\\n\\nPROCEDURE:
Tumescent Anesthesia Volume In Cc: 250
Disposition: Compression dressings and stocking(s) were placed post-operatively. Wound care instructions were given, verbal and written.
Add Additional Vein Treatment Details: No
Show Inventory: Hide

## 2025-06-26 ENCOUNTER — APPOINTMENT (OUTPATIENT)
Dept: URBAN - METROPOLITAN AREA CLINIC 135 | Facility: CLINIC | Age: 60
Setting detail: DERMATOLOGY
End: 2025-06-26

## 2025-06-26 DIAGNOSIS — I87.2 VENOUS INSUFFICIENCY (CHRONIC) (PERIPHERAL): ICD-10-CM

## 2025-06-26 PROCEDURE — ? RECOMMENDATIONS

## 2025-06-26 PROCEDURE — ?: Mod: RT

## 2025-06-26 PROCEDURE — ? VARITHENA SCLEROTHERAPY

## 2025-06-26 ASSESSMENT — LOCATION ZONE DERM: LOCATION ZONE: LEG

## 2025-06-26 ASSESSMENT — LOCATION DETAILED DESCRIPTION DERM: LOCATION DETAILED: RIGHT DISTAL PRETIBIAL REGION

## 2025-06-26 ASSESSMENT — LOCATION SIMPLE DESCRIPTION DERM: LOCATION SIMPLE: RIGHT PRETIBIAL REGION

## 2025-06-26 NOTE — PROCEDURE: VARITHENA SCLEROTHERAPY
Volume Of Varithena Foam Removed From Canister (Cc): 3
Detail Level: Simple
Lot # [id number]: [id number]
Post-Care Instructions: Provided to patient in written form.
Sclerosant Volume (Cc): 4
Sclerosant (A): Varithena Foam
Render Post Care In Note: Yes
Disposition: Compression stockings and short stretch elastic bandages were placed postoperatively.
Expiration Date A (Month Year): 02/2025
Number Of Injections (Will Not Render If 0): 0
Consent was obtained with risks, benefits, and alternatives discussed for the above procedures.
Sclerosant (A) %: 1
Show Inventory Tab: Hide

## 2025-06-26 NOTE — PROCEDURE: RECOMMENDATIONS
Recommendations (Free Text): Patient was treated with Varithena today.\\nPost care instructions were reviewed with patient verbally and given in written form.\\nTreatment plan was reviewed with patient and we will proceed with scheduling
Render Risk Assessment In Note?: yes
Recommendation Preamble: The following recommendations were made during the visit:
Detail Level: Zone
Patient Management Risk Assessment: Moderate

## 2025-07-10 ENCOUNTER — APPOINTMENT (OUTPATIENT)
Dept: URBAN - METROPOLITAN AREA CLINIC 135 | Facility: CLINIC | Age: 60
Setting detail: DERMATOLOGY
End: 2025-07-10

## 2025-07-10 DIAGNOSIS — I87.2 VENOUS INSUFFICIENCY (CHRONIC) (PERIPHERAL): ICD-10-CM

## 2025-07-10 PROCEDURE — ? DOPPLER US

## 2025-07-10 PROCEDURE — ?

## 2025-07-10 NOTE — PROCEDURE: DOPPLER US
Detail Level: Detailed
Use - 'see Attached Documentation' Verbiage?: Yes - Will Include Text Below and Will Remove Other Portions of the Note
See Attached Documentation Text: No evidence of Deep Vein Thrombosis.\\nSuccessful Closure of previously treated vein.\\nPlease refer to the attached ultrasound documentation for complete details of the procedure and the venous findings.
Use Ssv Or Lsv: SSV
Other Right Leg Doppler Findings: No evidence of Deep Vein Thrombosis. Successful closure of previously treated veins.

## 2025-07-31 ENCOUNTER — APPOINTMENT (OUTPATIENT)
Dept: URBAN - METROPOLITAN AREA CLINIC 135 | Facility: CLINIC | Age: 60
Setting detail: DERMATOLOGY
End: 2025-07-31

## 2025-07-31 DIAGNOSIS — I87.2 VENOUS INSUFFICIENCY (CHRONIC) (PERIPHERAL): ICD-10-CM

## 2025-07-31 PROCEDURE — ?: Mod: LT

## 2025-07-31 PROCEDURE — ? RECOMMENDATIONS

## 2025-07-31 PROCEDURE — ? VARITHENA SCLEROTHERAPY

## 2025-07-31 ASSESSMENT — LOCATION DETAILED DESCRIPTION DERM: LOCATION DETAILED: LEFT DISTAL PRETIBIAL REGION

## 2025-07-31 ASSESSMENT — LOCATION SIMPLE DESCRIPTION DERM: LOCATION SIMPLE: LEFT PRETIBIAL REGION

## 2025-07-31 ASSESSMENT — LOCATION ZONE DERM: LOCATION ZONE: LEG

## 2025-08-14 ENCOUNTER — APPOINTMENT (OUTPATIENT)
Dept: URBAN - METROPOLITAN AREA CLINIC 135 | Facility: CLINIC | Age: 60
Setting detail: DERMATOLOGY
End: 2025-08-14

## 2025-08-14 DIAGNOSIS — I87.2 VENOUS INSUFFICIENCY (CHRONIC) (PERIPHERAL): ICD-10-CM | Status: IMPROVED

## 2025-08-14 PROCEDURE — ?

## 2025-08-14 PROCEDURE — ? DOPPLER US

## 2025-08-14 PROCEDURE — ?: Mod: RT

## 2025-08-14 PROCEDURE — ? ULTRASOUND GUIDED SCLEROTHERAPY

## 2025-08-14 PROCEDURE — ? RECOMMENDATIONS

## 2025-08-14 ASSESSMENT — LOCATION DETAILED DESCRIPTION DERM: LOCATION DETAILED: RIGHT DISTAL PRETIBIAL REGION

## 2025-08-14 ASSESSMENT — LOCATION ZONE DERM: LOCATION ZONE: LEG

## 2025-08-14 ASSESSMENT — LOCATION SIMPLE DESCRIPTION DERM: LOCATION SIMPLE: RIGHT PRETIBIAL REGION
